# Patient Record
Sex: MALE | Race: WHITE | NOT HISPANIC OR LATINO
[De-identification: names, ages, dates, MRNs, and addresses within clinical notes are randomized per-mention and may not be internally consistent; named-entity substitution may affect disease eponyms.]

---

## 2022-08-15 ENCOUNTER — NON-APPOINTMENT (OUTPATIENT)
Age: 57
End: 2022-08-15

## 2024-04-08 PROBLEM — Z00.00 ENCOUNTER FOR PREVENTIVE HEALTH EXAMINATION: Status: ACTIVE | Noted: 2024-04-05

## 2024-04-09 ENCOUNTER — NON-APPOINTMENT (OUTPATIENT)
Age: 59
End: 2024-04-09

## 2024-04-09 ENCOUNTER — APPOINTMENT (OUTPATIENT)
Dept: CARDIOLOGY | Facility: CLINIC | Age: 59
End: 2024-04-09
Payer: COMMERCIAL

## 2024-04-09 VITALS
HEIGHT: 73 IN | DIASTOLIC BLOOD PRESSURE: 106 MMHG | HEART RATE: 84 BPM | SYSTOLIC BLOOD PRESSURE: 177 MMHG | BODY MASS INDEX: 22.93 KG/M2 | OXYGEN SATURATION: 100 % | WEIGHT: 173 LBS

## 2024-04-09 DIAGNOSIS — Z00.00 ENCOUNTER FOR GENERAL ADULT MEDICAL EXAMINATION W/OUT ABNORMAL FINDINGS: ICD-10-CM

## 2024-04-09 PROCEDURE — 93000 ELECTROCARDIOGRAM COMPLETE: CPT

## 2024-04-09 PROCEDURE — 99205 OFFICE O/P NEW HI 60 MIN: CPT

## 2024-04-09 PROCEDURE — G2211 COMPLEX E/M VISIT ADD ON: CPT

## 2024-04-11 ENCOUNTER — APPOINTMENT (OUTPATIENT)
Dept: NEUROSURGERY | Facility: CLINIC | Age: 59
End: 2024-04-11
Payer: COMMERCIAL

## 2024-04-11 ENCOUNTER — NON-APPOINTMENT (OUTPATIENT)
Age: 59
End: 2024-04-11

## 2024-04-11 VITALS
WEIGHT: 173 LBS | HEART RATE: 91 BPM | OXYGEN SATURATION: 100 % | SYSTOLIC BLOOD PRESSURE: 156 MMHG | HEIGHT: 73 IN | DIASTOLIC BLOOD PRESSURE: 93 MMHG | BODY MASS INDEX: 22.93 KG/M2

## 2024-04-11 DIAGNOSIS — I65.09 OCCLUSION AND STENOSIS OF UNSPECIFIED VERTEBRAL ARTERY: ICD-10-CM

## 2024-04-11 DIAGNOSIS — Z86.39 PERSONAL HISTORY OF OTHER ENDOCRINE, NUTRITIONAL AND METABOLIC DISEASE: ICD-10-CM

## 2024-04-11 DIAGNOSIS — R51.9 HEADACHE, UNSPECIFIED: ICD-10-CM

## 2024-04-11 DIAGNOSIS — Z78.9 OTHER SPECIFIED HEALTH STATUS: ICD-10-CM

## 2024-04-11 PROCEDURE — 99203 OFFICE O/P NEW LOW 30 MIN: CPT

## 2024-04-11 NOTE — PHYSICAL EXAM
[General Appearance - Alert] : alert [General Appearance - In No Acute Distress] : in no acute distress [Person] : oriented to person [Place] : oriented to place [Time] : oriented to time [Motor Strength] : muscle strength was normal in all four extremities [Involuntary Movements] : no involuntary movements were seen [] : no respiratory distress

## 2024-04-11 NOTE — REVIEW OF SYSTEMS
[Dizziness] : dizziness [Migraine Headache] : migraine headaches [As Noted in HPI] : as noted in HPI [Negative] : Heme/Lymph

## 2024-04-18 NOTE — ASSESSMENT
[FreeTextEntry1] : Impression: 58yr old male with very mild calcifications of the vertebral artery as they enter the head- minimal to no narrowing of the blood vessel  Plan: Recommend mri brain non con now to evaluate for stroke Recommend mra brain non con now to further evaluate for vertebral artery stenosis  no neurosurgical intervention required mri brain non con to evaluate for stroke ok to follow up with pcp these mild vertebral calcifications are not causing his headaches or dizziness

## 2024-04-18 NOTE — HISTORY OF PRESENT ILLNESS
[de-identified] : Adalberto is a 58yr old male here for a new patient visit. He went to [facility] ER for headaches visual disturbances floaters off balance. CTA head showed possible bilateral vertebral artery stenosis.

## 2024-04-23 ENCOUNTER — APPOINTMENT (OUTPATIENT)
Dept: MRI IMAGING | Facility: HOSPITAL | Age: 59
End: 2024-04-23

## 2024-04-25 ENCOUNTER — LABORATORY RESULT (OUTPATIENT)
Age: 59
End: 2024-04-25

## 2024-04-25 ENCOUNTER — APPOINTMENT (OUTPATIENT)
Dept: CARDIOLOGY | Facility: CLINIC | Age: 59
End: 2024-04-25
Payer: COMMERCIAL

## 2024-04-25 VITALS
SYSTOLIC BLOOD PRESSURE: 149 MMHG | RESPIRATION RATE: 16 BRPM | TEMPERATURE: 97.6 F | BODY MASS INDEX: 23.72 KG/M2 | HEIGHT: 73 IN | OXYGEN SATURATION: 99 % | DIASTOLIC BLOOD PRESSURE: 88 MMHG | WEIGHT: 179 LBS | HEART RATE: 82 BPM

## 2024-04-25 DIAGNOSIS — E78.41 ELEVATED LIPOPROTEIN(A): ICD-10-CM

## 2024-04-25 DIAGNOSIS — Z95.5 PRESENCE OF CORONARY ANGIOPLASTY IMPLANT AND GRAFT: ICD-10-CM

## 2024-04-25 DIAGNOSIS — E10.9 TYPE 1 DIABETES MELLITUS W/OUT COMPLICATIONS: ICD-10-CM

## 2024-04-25 DIAGNOSIS — E78.5 HYPERLIPIDEMIA, UNSPECIFIED: ICD-10-CM

## 2024-04-25 DIAGNOSIS — I25.10 ATHEROSCLEROTIC HEART DISEASE OF NATIVE CORONARY ARTERY W/OUT ANGINA PECTORIS: ICD-10-CM

## 2024-04-25 DIAGNOSIS — E78.01 FAMILIAL HYPERCHOLESTEROLEMIA: ICD-10-CM

## 2024-04-25 DIAGNOSIS — Z78.9 OTHER SPECIFIED HEALTH STATUS: ICD-10-CM

## 2024-04-25 PROCEDURE — G2211 COMPLEX E/M VISIT ADD ON: CPT

## 2024-04-25 PROCEDURE — 99203 OFFICE O/P NEW LOW 30 MIN: CPT

## 2024-04-26 RX ORDER — ALIROCUMAB 75 MG/ML
75 INJECTION, SOLUTION SUBCUTANEOUS
Refills: 0 | Status: DISCONTINUED | COMMUNITY
End: 2024-04-26

## 2024-04-30 PROBLEM — E78.01 HETEROZYGOUS FAMILIAL HYPERCHOLESTEROLEMIA: Status: ACTIVE | Noted: 2024-04-30

## 2024-04-30 NOTE — REASON FOR VISIT
[CV Risk Factors and Non-Cardiac Disease] : CV risk factors and non-cardiac disease [Hyperlipidemia] : hyperlipidemia [Coronary Artery Disease] : coronary artery disease [FreeTextEntry3] : Dr. Cantu [FreeTextEntry1] : Pt is a 58-year-old male with a past medical history significant for heterozygous familial hypercholesterolemia, type 1 diabetes, possible MI s/p 3 stents in LAD, RCA, and PDA (2022), elevated Lp(a), and statin intolerance who presents today for a lipid/apheresis consultation. Cardiac risk factors include hyperlipidemia and family history of cardiac disease (mother- coronary artery disease, CABG in her 70's, aortic stenosis, hypertension, hyperlipidemia; Father- mild hypertension). Pt denies history of smoking, rheumatic fever, and does not drink excessive caffeine or alcohol.   Today pt is feeling well and does not have any cardiac complaints at this time. Pt denies chest pain, shortness of breath, palpitations, dyspnea on exertion, dizziness, headaches, or syncope.   In 2022, pt was on a cruise when he felt chest pain. The pain subsided after several hours and pt did not seek medical attention. When he returned, he had an EKG that showed possible inferior infarct, which was changed from previous EKG that showed normal sinus rhythm. He then had a cardiac catheterization that led to 3 stent placements (LAD, RCA, PDA). He has had 2 nuclear stress tests since his stent placement.  He has previously tried many different statins, including Lipitor, Crestor, Pravachol, and Zocor, all of which caused memory loss, muscle aches, and elevated LFTs. The symptoms resolved once he stopped the medications. He has been on Praulent for 3 years and has successfully lowered his LDL. He was recently found to have an elevated Lp(a) despite the Praulent treatment and was wanting to discuss possible apheresis treatment to help lower his Lp(a) and lower his cardiovascular risk due to progressive atherosclerotic disease.

## 2024-04-30 NOTE — DISCUSSION/SUMMARY
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 The patient is followed by his primary cardiologist, as well as his lipidologist. He denies chest pain, shortness of breath, dizziness, palpitations or syncope.  He does not drink excessive caffeine or alcohol.  He has no history of rheumatic fever. Cardiac risk factors include heterozygous familial hypercholesterolemia, elevated lipoprotein a as a cardiovascular risk enhancing feature, and family history of cardiac disease (mother- coronary artery disease, CABG in her 70's, aortic stenosis, hypertension, hyperlipidemia; Father- mild hypertension). Lipid panel done June 14, 2017 at [facility] demonstrated a total cholesterol 272, LDL direct of 217 mg/dL, LDL calculated of 205 mg/dL, HDL 52 mg/dL, triglycerides 76 mg/dL and hemoglobin A1c of 7.7. The patient's cholesterol and LDL may be even higher as he was on and off statin therapy at this time. These findings are consistent with heterozygous familial hypercholesterolemia. The patient is on PCSK9 inhibitor Praluent 75 mg biweekly. Despite having a LDL cholesterol at target, the patient has developed progressive coronary artery disease most likely accelerated by the presence of an elevated lipoprotein a of 208 nmol/L Lipid panel done March 27, 2024 demonstrated cholesterol 102, triglycerides 94, HDL of 42, non-HDL cholesterol of 60 mg/dL and LDL calculated of 42 mg/dL.  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 I have asked him to follow-up with his primary cardiologist, regarding this finding. I have also recommended he work with a registered dietitian to improve his lipid panel, glycemic state, and caloric intake. PMH: In 2022, pt was on a cruise when he felt chest pain. The pain subsided after several hours and pt did not seek medical attention. When he returned, he had an EKG that showed possible inferior infarct, which was changed from previous EKG that showed normal sinus rhythm. He then had a cardiac catheterization that led to 3 stent placements (LAD, RCA, PDA). STATIN INTOLERANCE: He has previously tried many different statins, including Lipitor, Crestor, Pravachol, and Zocor, all of which caused memory loss, muscle aches, joint aches and elevated LFTs. The symptoms resolved once he stopped the medications.  This patient has heterozygous familial hypercholesterolemia, significantly elevated lipoprotein a, and has significant coronary artery disease. He has been on Praulent for 3 years and has successfully lowered his LDL, however had progressive coronary disease. He was recently found to have an elevated Lp(a) despite the Praulent treatment.  The patient has heterozygous familial hypercholesterolemia, progressive coronary artery disease, elevated lipoprotein a, and is an appropriate candidate for Lp(a) apheresis and Lipid Apheresis. It is clear that the elevated lipoprotein a is contributing to the acceleration of his atherosclerosis. Lipid apheresis is approved by the FDA for patients with heterozygous familial hypercholesterolemia, elevated lipoprotein a, coronary artery disease.  I have explained to him in detail the procedure including the placement of a subcutaneous catheters for the procedure as well as the apheresis procedure.  He is amenable to therapy. In the meantime he will increase his Praluent 150 mg subcutaneously biweekly to further reduce his LDL cholesterol and possibly his LPA level. He will have follow-up blood work for lipoprotein a in 3 to 6 months.  The patient understands that aerobic exercises must be increased to 40 minutes 4 times per week. A detailed discussion of lifestyle modification was done today. The patient has a good understanding of the diagnosis, and treatment plan. Lifestyle modification was also outlined.  Thank you for allow me to participate in care of your patient.

## 2024-05-02 ENCOUNTER — NON-APPOINTMENT (OUTPATIENT)
Age: 59
End: 2024-05-02

## 2024-05-03 RX ORDER — METOPROLOL TARTRATE 75 MG/1
TABLET, FILM COATED ORAL
Refills: 0 | Status: ACTIVE | COMMUNITY

## 2024-05-03 RX ORDER — TICAGRELOR 60 MG/1
TABLET ORAL
Refills: 0 | Status: ACTIVE | COMMUNITY

## 2024-05-03 RX ORDER — BUSPIRONE HYDROCHLORIDE 7.5 MG/1
TABLET ORAL
Refills: 0 | Status: DISCONTINUED | COMMUNITY
End: 2024-05-03

## 2024-05-03 RX ORDER — ASPIRIN 81 MG
81 TABLET, DELAYED RELEASE (ENTERIC COATED) ORAL
Refills: 0 | Status: ACTIVE | COMMUNITY

## 2024-05-08 RX ORDER — ALIROCUMAB 150 MG/ML
150 INJECTION, SOLUTION SUBCUTANEOUS
Qty: 6 | Refills: 1 | Status: ACTIVE | COMMUNITY
Start: 2024-04-26 | End: 1900-01-01

## 2024-05-29 ENCOUNTER — NON-APPOINTMENT (OUTPATIENT)
Age: 59
End: 2024-05-29

## 2024-07-01 PROBLEM — Z00.00 ENCOUNTER FOR PREVENTIVE HEALTH EXAMINATION: Status: ACTIVE | Noted: 2024-07-01

## 2024-07-30 ENCOUNTER — APPOINTMENT (OUTPATIENT)
Dept: PULMONOLOGY | Facility: CLINIC | Age: 59
End: 2024-07-30

## 2024-08-06 ENCOUNTER — NON-APPOINTMENT (OUTPATIENT)
Age: 59
End: 2024-08-06

## 2024-09-23 ENCOUNTER — NON-APPOINTMENT (OUTPATIENT)
Age: 59
End: 2024-09-23